# Patient Record
Sex: MALE | Race: WHITE | NOT HISPANIC OR LATINO | URBAN - NONMETROPOLITAN AREA
[De-identification: names, ages, dates, MRNs, and addresses within clinical notes are randomized per-mention and may not be internally consistent; named-entity substitution may affect disease eponyms.]

---

## 2017-03-21 NOTE — PATIENT DISCUSSION
Discussed the risks/benefits of laser capsulotomy. Laser recommended. Patient elects to proceed. OD Done today, OS to be done after Retinal clearance.

## 2017-03-21 NOTE — PROCEDURE NOTE: CLINICAL
Prior to treatment, the risks/benefits/alternatives were discussed. The patient wished to proceed with procedure. Power = 2.8 mJ. Number of pulses = 24. Patient tolerated procedure well. There were no complications. Post-procedure instructions given.  TA 19

## 2019-09-30 ENCOUNTER — IMPORTED ENCOUNTER (OUTPATIENT)
Dept: URBAN - NONMETROPOLITAN AREA CLINIC 1 | Facility: CLINIC | Age: 39
End: 2019-09-30

## 2019-09-30 PROCEDURE — 92014 COMPRE OPH EXAM EST PT 1/>: CPT

## 2019-09-30 PROCEDURE — 92310 CONTACT LENS FITTING OU: CPT

## 2019-09-30 PROCEDURE — 92015 DETERMINE REFRACTIVE STATE: CPT

## 2019-09-30 PROCEDURE — V2520 CONTACT LENS HYDROPHILIC: HCPCS

## 2019-09-30 NOTE — PATIENT DISCUSSION
"*Gls RX:.-	  A new spectacle prescription was created and dispensed today. ""*Contact lens habits:.-	  Healthy contact lens wearing habits were discussed including nightly removal and cleaning of contacts and adherence to the recommended replacement schedule for disposable lenses. -	  Symptoms of infection were reviewed and instructions were given to discontinue contact lens use and call for an urgent appointment in the event of an eye infection. *Cl fit:.-	  Contact lenses fit well appropriately moving and re-centering with each blink. "

## 2021-06-11 ENCOUNTER — IMPORTED ENCOUNTER (OUTPATIENT)
Dept: URBAN - NONMETROPOLITAN AREA CLINIC 1 | Facility: CLINIC | Age: 41
End: 2021-06-11

## 2021-06-11 PROCEDURE — 92014 COMPRE OPH EXAM EST PT 1/>: CPT

## 2021-06-11 PROCEDURE — 92015 DETERMINE REFRACTIVE STATE: CPT

## 2021-06-11 PROCEDURE — 92310 CONTACT LENS FITTING OU: CPT

## 2022-04-09 ASSESSMENT — VISUAL ACUITY
OS_SC: 20/25
OD_SC: 20/20
OD_SC: 20/20
OS_SC: 20/25
OS_SC: 20/20

## 2022-04-09 ASSESSMENT — TONOMETRY
OS_IOP_MMHG: 15
OD_IOP_MMHG: 15
OS_IOP_MMHG: 16
OD_IOP_MMHG: 16

## 2022-12-20 ENCOUNTER — ESTABLISHED PATIENT (OUTPATIENT)
Dept: RURAL CLINIC 1 | Facility: CLINIC | Age: 42
End: 2022-12-20

## 2022-12-20 PROCEDURE — 92014 COMPRE OPH EXAM EST PT 1/>: CPT

## 2022-12-20 PROCEDURE — 92310-E CONTACT LENS FITTING ESTABLISH PATIENT

## 2022-12-20 PROCEDURE — 92015 DETERMINE REFRACTIVE STATE: CPT

## 2022-12-20 ASSESSMENT — VISUAL ACUITY
OU_CC: 20/40
OS_CC: 20/20
OD_CC: 20/20

## 2022-12-20 ASSESSMENT — TONOMETRY
OD_IOP_MMHG: 14
OS_IOP_MMHG: 14

## 2024-03-22 ENCOUNTER — COMPREHENSIVE EXAM (OUTPATIENT)
Dept: RURAL CLINIC 1 | Facility: CLINIC | Age: 44
End: 2024-03-22

## 2024-03-22 DIAGNOSIS — H52.13: ICD-10-CM

## 2024-03-22 PROCEDURE — 92015 DETERMINE REFRACTIVE STATE: CPT

## 2024-03-22 PROCEDURE — 92014 COMPRE OPH EXAM EST PT 1/>: CPT

## 2024-03-22 PROCEDURE — 92310-E CONTACT LENS FITTING ESTABLISH PATIENT

## 2024-03-22 ASSESSMENT — TONOMETRY
OS_IOP_MMHG: 14
OD_IOP_MMHG: 14

## 2024-03-22 ASSESSMENT — VISUAL ACUITY
OU_CC: 20/30
OS_CC: 20/25
OD_CC: 20/20-2

## 2025-03-28 ENCOUNTER — COMPREHENSIVE EXAM (OUTPATIENT)
Age: 45
End: 2025-03-28

## 2025-03-28 DIAGNOSIS — H52.13: ICD-10-CM

## 2025-03-28 PROCEDURE — 92310-1 LEVEL 1 SOFT LENS UPDATE

## 2025-03-28 PROCEDURE — 92015 DETERMINE REFRACTIVE STATE: CPT

## 2025-03-28 PROCEDURE — 92014 COMPRE OPH EXAM EST PT 1/>: CPT
